# Patient Record
Sex: MALE | Race: WHITE
[De-identification: names, ages, dates, MRNs, and addresses within clinical notes are randomized per-mention and may not be internally consistent; named-entity substitution may affect disease eponyms.]

---

## 2018-11-27 ENCOUNTER — HOSPITAL ENCOUNTER (EMERGENCY)
Dept: HOSPITAL 47 - EC | Age: 38
Discharge: HOME | End: 2018-11-27
Payer: COMMERCIAL

## 2018-11-27 VITALS
RESPIRATION RATE: 18 BRPM | HEART RATE: 82 BPM | DIASTOLIC BLOOD PRESSURE: 86 MMHG | TEMPERATURE: 98.2 F | SYSTOLIC BLOOD PRESSURE: 135 MMHG

## 2018-11-27 DIAGNOSIS — Z79.899: ICD-10-CM

## 2018-11-27 DIAGNOSIS — Z88.5: ICD-10-CM

## 2018-11-27 DIAGNOSIS — Z87.828: ICD-10-CM

## 2018-11-27 DIAGNOSIS — R51: ICD-10-CM

## 2018-11-27 DIAGNOSIS — R00.0: ICD-10-CM

## 2018-11-27 DIAGNOSIS — Z88.0: ICD-10-CM

## 2018-11-27 DIAGNOSIS — F17.200: ICD-10-CM

## 2018-11-27 DIAGNOSIS — I10: Primary | ICD-10-CM

## 2018-11-27 LAB
ALBUMIN SERPL-MCNC: 4.8 G/DL (ref 3.5–5)
ALP SERPL-CCNC: 69 U/L (ref 38–126)
ALT SERPL-CCNC: 41 U/L (ref 21–72)
ANION GAP SERPL CALC-SCNC: 11 MMOL/L
APTT BLD: 24.4 SEC (ref 22–30)
AST SERPL-CCNC: 40 U/L (ref 17–59)
BASOPHILS # BLD AUTO: 0 K/UL (ref 0–0.2)
BASOPHILS NFR BLD AUTO: 1 %
BUN SERPL-SCNC: 18 MG/DL (ref 9–20)
CALCIUM SPEC-MCNC: 10.2 MG/DL (ref 8.4–10.2)
CHLORIDE SERPL-SCNC: 103 MMOL/L (ref 98–107)
CK SERPL-CCNC: 70 U/L (ref 55–170)
CO2 SERPL-SCNC: 26 MMOL/L (ref 22–30)
EOSINOPHIL # BLD AUTO: 0.2 K/UL (ref 0–0.7)
EOSINOPHIL NFR BLD AUTO: 2 %
ERYTHROCYTE [DISTWIDTH] IN BLOOD BY AUTOMATED COUNT: 4.62 M/UL (ref 4.3–5.9)
ERYTHROCYTE [DISTWIDTH] IN BLOOD: 12.5 % (ref 11.5–15.5)
GLUCOSE SERPL-MCNC: 103 MG/DL (ref 74–99)
HCT VFR BLD AUTO: 43.4 % (ref 39–53)
HGB BLD-MCNC: 15.4 GM/DL (ref 13–17.5)
INR PPP: 1 (ref ?–1.2)
LYMPHOCYTES # SPEC AUTO: 1 K/UL (ref 1–4.8)
LYMPHOCYTES NFR SPEC AUTO: 12 %
MCH RBC QN AUTO: 33.3 PG (ref 25–35)
MCHC RBC AUTO-ENTMCNC: 35.4 G/DL (ref 31–37)
MCV RBC AUTO: 93.9 FL (ref 80–100)
MONOCYTES # BLD AUTO: 0.4 K/UL (ref 0–1)
MONOCYTES NFR BLD AUTO: 6 %
NEUTROPHILS # BLD AUTO: 6.1 K/UL (ref 1.3–7.7)
NEUTROPHILS NFR BLD AUTO: 78 %
PH UR: 6.5 [PH] (ref 5–8)
PLATELET # BLD AUTO: 219 K/UL (ref 150–450)
POTASSIUM SERPL-SCNC: 5.2 MMOL/L (ref 3.5–5.1)
PROT SERPL-MCNC: 7.9 G/DL (ref 6.3–8.2)
PT BLD: 9.8 SEC (ref 9–12)
SODIUM SERPL-SCNC: 140 MMOL/L (ref 137–145)
SP GR UR: 1.02 (ref 1–1.03)
TROPONIN I SERPL-MCNC: <0.012 NG/ML (ref 0–0.03)
UROBILINOGEN UR QL STRIP: 2 MG/DL (ref ?–2)
WBC # BLD AUTO: 7.8 K/UL (ref 3.8–10.6)

## 2018-11-27 PROCEDURE — 36415 COLL VENOUS BLD VENIPUNCTURE: CPT

## 2018-11-27 PROCEDURE — 96361 HYDRATE IV INFUSION ADD-ON: CPT

## 2018-11-27 PROCEDURE — 99285 EMERGENCY DEPT VISIT HI MDM: CPT

## 2018-11-27 PROCEDURE — 81003 URINALYSIS AUTO W/O SCOPE: CPT

## 2018-11-27 PROCEDURE — 80053 COMPREHEN METABOLIC PANEL: CPT

## 2018-11-27 PROCEDURE — 85610 PROTHROMBIN TIME: CPT

## 2018-11-27 PROCEDURE — 96375 TX/PRO/DX INJ NEW DRUG ADDON: CPT

## 2018-11-27 PROCEDURE — 82550 ASSAY OF CK (CPK): CPT

## 2018-11-27 PROCEDURE — 93005 ELECTROCARDIOGRAM TRACING: CPT

## 2018-11-27 PROCEDURE — 85025 COMPLETE CBC W/AUTO DIFF WBC: CPT

## 2018-11-27 PROCEDURE — 82553 CREATINE MB FRACTION: CPT

## 2018-11-27 PROCEDURE — 96376 TX/PRO/DX INJ SAME DRUG ADON: CPT

## 2018-11-27 PROCEDURE — 70450 CT HEAD/BRAIN W/O DYE: CPT

## 2018-11-27 PROCEDURE — 84484 ASSAY OF TROPONIN QUANT: CPT

## 2018-11-27 PROCEDURE — 96374 THER/PROPH/DIAG INJ IV PUSH: CPT

## 2018-11-27 PROCEDURE — 85730 THROMBOPLASTIN TIME PARTIAL: CPT

## 2018-11-27 NOTE — CT
EXAMINATION: CT brain wo con

 

DATE AND TIME:  11/27/2018 5:00 PM

 

CLINICAL INDICATION: Pain 

Headache and hypertension x 2-3 weeks after left supraorbital head injury.

 

TECHNIQUE: Standard departmental protocol.

 

COMPARISON: None.

 

FINDINGS: 

The calvarium is intact. 

 

There is no intracranial hemorrhage. 

 

There is no intracranial mass or mass effect. 

 

No definite new intra-axial or extra-axial attenuation defect. 

 

The paranasal sinuses, middle ear cavities, and mastoid sinus air cells are clear. 

 

The orbits are unremarkable.

 

IMPRESSION:

NO ACUTE PROCESS.

## 2018-11-27 NOTE — ED
General Adult HPI





- General


Source: patient, family, RN notes reviewed


Mode of arrival: wheelchair


Limitations: no limitations





<Charles Mota - Last Filed: 11/27/18 18:33>





<Prem Davison - Last Filed: 11/27/18 20:19>





- General


Chief complaint: Arrhythmia/Palpitations


Stated complaint: High BP/headache/chest pounding


Time Seen by Provider: 11/27/18 12:13





- History of Present Illness


Initial comments: 





Patient 38-year-old male presenting to the emergency room today with chief 

complaint of headache 3 days.  Patient states that headache is located in the 

back and feels like pressure.  Patient does admit that his headaches started 

earlier last week.  States they were coming and going.  States the headache has 

been persistent for the past 3 days.  He does admit that his blood pressures 

been elevated.  He states he stopped taking his blood pressure medication 

several months back.  He does admit that he had a head injury approximately a 

month ago he was dizzy afterwards the dizziness has stopped.  He denies any 

other complaints or symptoms. Patient denies any recent fever, chills, 

shortness of breath, chest pain, back pain, abdominal pain, nausea or vomiting, 

numbness or tingling, dysuria or hematuria, constipation or diarrhea, visual 

changes, or any other complaints. (Charles Mota)





- Related Data


 Home Medications











 Medication  Instructions  Recorded  Confirmed


 


Ibuprofen [Motrin Ib] 800 mg PO Q6H PRN 11/27/18 11/27/18


 


Metoprolol Tartrate 25 mg PO BID 11/27/18 11/27/18








 Previous Rx's











 Medication  Instructions  Recorded


 


amLODIPine [Norvasc] 5 mg PO DAILY #7 tab 11/27/18











 Allergies











Allergy/AdvReac Type Severity Reaction Status Date / Time


 


codeine Allergy  Nausea & Verified 11/27/18 12:12





   Vomiting  


 


Penicillins Allergy  Unknown Verified 11/27/18 12:12





   Childhood  














Review of Systems


ROS Other: All systems not noted in ROS Statement are negative.





<Charles Mota - Last Filed: 11/27/18 18:33>


ROS Other: All systems not noted in ROS Statement are negative.





<Prem Davison - Last Filed: 11/27/18 20:19>


ROS Statement: 


Those systems with pertinent positive or pertinent negative responses have been 

documented in the HPI.








Past Medical History


Past Medical History: Hypertension


Additional Past Medical History / Comment(s): head injury


History of Any Multi-Drug Resistant Organisms: None Reported


Past Surgical History: No Surgical Hx Reported


Past Psychological History: No Psychological Hx Reported


Smoking Status: Current every day smoker


Past Alcohol Use History: Occasional


Past Drug Use History: Marijuana





<Charles Mota - Last Filed: 11/27/18 18:33>





General Exam


Limitations: no limitations





<Charles Mota - Last Filed: 11/27/18 18:33>





<Prem Davison - Last Filed: 11/27/18 20:19>





- General Exam Comments


Initial Comments: 





General:  The patient is awake and alert, in no distress, and does not appear 

acutely ill. 


Eye:  Pupils are equal, round and reactive to light, extra-ocular movements are 

intact.  No nystagmus.  There is normal conjunctiva bilaterally.  No signs of 

icterus.  


Ears, nose, mouth and throat:  There are moist mucous membranes and no oral 

lesions. 


Neck:  The neck is supple, there is no tenderness or JVD.  


Cardiovascular: Tachycardic.  No murmur, rub or gallop is appreciated.


Respiratory:  Lungs are clear to auscultation, respirations are non-labored, 

breath sounds are equal.  No wheezes, stridor, rales, or rhonchi.


Gastrointestinal:  Soft, non-distended, non-tender abdomen without masses or 

organomegaly noted. 


Musculoskeletal:  Normal ROM, no tenderness.  Strength 5/5. Sensation intact. 

Pulses equal bilaterally 2+.  


Neurological:  A&O x 3. CN II-XII intact, There are no obvious motor or sensory 

deficits. Coordination appears grossly intact. Speech is normal.


Skin:  Skin is warm and dry and no rashes or lesions are noted. 


Psychiatric:  Cooperative, appropriate mood & affect, normal judgment.   (Charles Mota)





 Vital Signs











  11/27/18 11/27/18 11/27/18





  12:04 12:21 12:45


 


Temperature 97.6 F  


 


Pulse Rate 114 H  100


 


Pulse Rate [  114 H 





Cardiac Monitor   





]   


 


Respiratory 18  20





Rate   


 


Blood Pressure 174/126  150/105


 


O2 Sat by Pulse   98





Oximetry   














  11/27/18 11/27/18 11/27/18





  13:15 14:01 14:15


 


Temperature   


 


Pulse Rate 111 H 69 91


 


Pulse Rate [   





Cardiac Monitor   





]   


 


Respiratory 20 20 20





Rate   


 


Blood Pressure 165/108 154/116 149/96


 


O2 Sat by Pulse 98 99 99





Oximetry   














  11/27/18 11/27/18 11/27/18





  14:45 15:15 15:50


 


Temperature   


 


Pulse Rate 98 88 99


 


Pulse Rate [   





Cardiac Monitor   





]   


 


Respiratory 18 18 20





Rate   


 


Blood Pressure 166/106 145/97 189/48


 


O2 Sat by Pulse 99 99 99





Oximetry   














  11/27/18 11/27/18 11/27/18





  16:30 17:00 17:30


 


Temperature   


 


Pulse Rate 98 108 H 103 H


 


Pulse Rate [   





Cardiac Monitor   





]   


 


Respiratory 20 18 20





Rate   


 


Blood Pressure 173/108 177/119 151/87


 


O2 Sat by Pulse 99 99 99





Oximetry   














  11/27/18 11/27/18





  18:15 19:00


 


Temperature  


 


Pulse Rate 101 H 88


 


Pulse Rate [  





Cardiac Monitor  





]  


 


Respiratory 20 20





Rate  


 


Blood Pressure 146/92 145/81


 


O2 Sat by Pulse 99 97





Oximetry  














EKG Findings





- EKG Comments:


EKG Findings:: EKG performed at 1217: Shows sinus tachycardia 107 bpm.  .

  QRS 86.  QT//443.  No acute ST changes.





<Charles Mota - Last Filed: 11/27/18 18:33>





Medical Decision Making





- Lab Data


Result diagrams: 


 11/27/18 12:10





 11/27/18 12:10





<Charles Mota - Last Filed: 11/27/18 18:33>





- Lab Data


Result diagrams: 


 11/27/18 12:10





 11/27/18 12:10





- Radiology Data


Radiology results: report reviewed (Computed tomography scan of the brain 

reveals no acute process)





<Prem Davison - Last Filed: 11/27/18 20:19>





- Medical Decision Making





Patient reevaluated by myself, Dr. Davison.  Patient resting comfortably in bed.  

Patient states he still has somewhat of a headache however is requesting 

discharge.  Blood pressure has improved.  Patient confirms headache was not 

sudden onset.  Patient updated on results and need for follow-up.  Patient 

states he is supposed be on something to control his blood pressure however has 

not been on it for months. (Prem Davison)





- Lab Data





 Lab Results











  11/27/18 11/27/18 11/27/18 Range/Units





  12:10 12:10 12:10 


 


WBC   7.8   (3.8-10.6)  k/uL


 


RBC   4.62   (4.30-5.90)  m/uL


 


Hgb   15.4   (13.0-17.5)  gm/dL


 


Hct   43.4   (39.0-53.0)  %


 


MCV   93.9   (80.0-100.0)  fL


 


MCH   33.3   (25.0-35.0)  pg


 


MCHC   35.4   (31.0-37.0)  g/dL


 


RDW   12.5   (11.5-15.5)  %


 


Plt Count   219   (150-450)  k/uL


 


Neutrophils %   78   %


 


Lymphocytes %   12   %


 


Monocytes %   6   %


 


Eosinophils %   2   %


 


Basophils %   1   %


 


Neutrophils #   6.1   (1.3-7.7)  k/uL


 


Lymphocytes #   1.0   (1.0-4.8)  k/uL


 


Monocytes #   0.4   (0-1.0)  k/uL


 


Eosinophils #   0.2   (0-0.7)  k/uL


 


Basophils #   0.0   (0-0.2)  k/uL


 


PT     (9.0-12.0)  sec


 


INR     (<1.2)  


 


APTT     (22.0-30.0)  sec


 


Sodium    140  (137-145)  mmol/L


 


Potassium    5.2 H  (3.5-5.1)  mmol/L


 


Chloride    103  ()  mmol/L


 


Carbon Dioxide    26  (22-30)  mmol/L


 


Anion Gap    11  mmol/L


 


BUN    18  (9-20)  mg/dL


 


Creatinine    0.87  (0.66-1.25)  mg/dL


 


Est GFR (CKD-EPI)AfAm    >90  (>60 ml/min/1.73 sqM)  


 


Est GFR (CKD-EPI)NonAf    >90  (>60 ml/min/1.73 sqM)  


 


Glucose    103 H  (74-99)  mg/dL


 


Calcium    10.2  (8.4-10.2)  mg/dL


 


Total Bilirubin    0.9  (0.2-1.3)  mg/dL


 


AST    40  (17-59)  U/L


 


ALT    41  (21-72)  U/L


 


Alkaline Phosphatase    69  ()  U/L


 


Total Creatine Kinase  70    ()  U/L


 


CK-MB (CK-2)  0.6    (0.0-2.4)  ng/mL


 


CK-MB (CK-2) Rel Index  0.9    


 


Troponin I  <0.012    (0.000-0.034)  ng/mL


 


Total Protein    7.9  (6.3-8.2)  g/dL


 


Albumin    4.8  (3.5-5.0)  g/dL


 


Urine Color     


 


Urine Appearance     (Clear)  


 


Urine pH     (5.0-8.0)  


 


Ur Specific Gravity     (1.001-1.035)  


 


Urine Protein     (Negative)  


 


Urine Glucose (UA)     (Negative)  


 


Urine Ketones     (Negative)  


 


Urine Blood     (Negative)  


 


Urine Nitrite     (Negative)  


 


Urine Bilirubin     (Negative)  


 


Urine Urobilinogen     (<2.0)  mg/dL


 


Ur Leukocyte Esterase     (Negative)  














  11/27/18 11/27/18 Range/Units





  12:10 14:00 


 


WBC    (3.8-10.6)  k/uL


 


RBC    (4.30-5.90)  m/uL


 


Hgb    (13.0-17.5)  gm/dL


 


Hct    (39.0-53.0)  %


 


MCV    (80.0-100.0)  fL


 


MCH    (25.0-35.0)  pg


 


MCHC    (31.0-37.0)  g/dL


 


RDW    (11.5-15.5)  %


 


Plt Count    (150-450)  k/uL


 


Neutrophils %    %


 


Lymphocytes %    %


 


Monocytes %    %


 


Eosinophils %    %


 


Basophils %    %


 


Neutrophils #    (1.3-7.7)  k/uL


 


Lymphocytes #    (1.0-4.8)  k/uL


 


Monocytes #    (0-1.0)  k/uL


 


Eosinophils #    (0-0.7)  k/uL


 


Basophils #    (0-0.2)  k/uL


 


PT  9.8   (9.0-12.0)  sec


 


INR  1.0   (<1.2)  


 


APTT  24.4   (22.0-30.0)  sec


 


Sodium    (137-145)  mmol/L


 


Potassium    (3.5-5.1)  mmol/L


 


Chloride    ()  mmol/L


 


Carbon Dioxide    (22-30)  mmol/L


 


Anion Gap    mmol/L


 


BUN    (9-20)  mg/dL


 


Creatinine    (0.66-1.25)  mg/dL


 


Est GFR (CKD-EPI)AfAm    (>60 ml/min/1.73 sqM)  


 


Est GFR (CKD-EPI)NonAf    (>60 ml/min/1.73 sqM)  


 


Glucose    (74-99)  mg/dL


 


Calcium    (8.4-10.2)  mg/dL


 


Total Bilirubin    (0.2-1.3)  mg/dL


 


AST    (17-59)  U/L


 


ALT    (21-72)  U/L


 


Alkaline Phosphatase    ()  U/L


 


Total Creatine Kinase    ()  U/L


 


CK-MB (CK-2)    (0.0-2.4)  ng/mL


 


CK-MB (CK-2) Rel Index    


 


Troponin I    (0.000-0.034)  ng/mL


 


Total Protein    (6.3-8.2)  g/dL


 


Albumin    (3.5-5.0)  g/dL


 


Urine Color   Yellow  


 


Urine Appearance   Clear  (Clear)  


 


Urine pH   6.5  (5.0-8.0)  


 


Ur Specific Gravity   1.021  (1.001-1.035)  


 


Urine Protein   Trace H  (Negative)  


 


Urine Glucose (UA)   Negative  (Negative)  


 


Urine Ketones   Negative  (Negative)  


 


Urine Blood   Negative  (Negative)  


 


Urine Nitrite   Negative  (Negative)  


 


Urine Bilirubin   Negative  (Negative)  


 


Urine Urobilinogen   2.0  (<2.0)  mg/dL


 


Ur Leukocyte Esterase   Negative  (Negative)  














Disposition





<Charles Mota - Last Filed: 11/27/18 18:33>


Is patient prescribed a controlled substance at d/c from ED?: No





<Prem Davison - Last Filed: 11/27/18 20:19>


Clinical Impression: 


 Cephalgia, Hypertension





Disposition: HOME SELF-CARE


Condition: Stable


Instructions:  Hypertension (ED), Acute Headache (ED)


Additional Instructions: 


Please follow-up with her primary care physician this week.  You will need 

further care regarding blood pressure.  Return for increased pain, weakness, 

confusion, uncontrolled blood pressure, worsening or changing symptoms or other 

concerns.


Prescriptions: 


amLODIPine [Norvasc] 5 mg PO DAILY #7 tab


Referrals: 


GWYN Bob III, MD [STAFF PHYSICIAN] - 1-2 days